# Patient Record
Sex: FEMALE | Race: BLACK OR AFRICAN AMERICAN
[De-identification: names, ages, dates, MRNs, and addresses within clinical notes are randomized per-mention and may not be internally consistent; named-entity substitution may affect disease eponyms.]

---

## 2017-04-16 ENCOUNTER — HOSPITAL ENCOUNTER (EMERGENCY)
Dept: HOSPITAL 79 - ER1 | Age: 57
Discharge: HOME | End: 2017-04-16
Payer: MEDICARE

## 2017-04-16 DIAGNOSIS — J44.9: ICD-10-CM

## 2017-04-16 DIAGNOSIS — Z79.899: ICD-10-CM

## 2017-04-16 DIAGNOSIS — F17.210: ICD-10-CM

## 2017-04-16 DIAGNOSIS — Z88.0: ICD-10-CM
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
## 2017-04-16 DIAGNOSIS — S39.012A: Primary | ICD-10-CM

## 2022-08-25 ENCOUNTER — OFFICE VISIT (OUTPATIENT)
Dept: CARDIOLOGY | Facility: CLINIC | Age: 62
End: 2022-08-25

## 2022-08-25 VITALS
SYSTOLIC BLOOD PRESSURE: 113 MMHG | DIASTOLIC BLOOD PRESSURE: 73 MMHG | HEIGHT: 63 IN | WEIGHT: 151.8 LBS | BODY MASS INDEX: 26.9 KG/M2 | HEART RATE: 62 BPM | OXYGEN SATURATION: 97 %

## 2022-08-25 DIAGNOSIS — R94.31 ABNORMAL EKG: ICD-10-CM

## 2022-08-25 DIAGNOSIS — F17.211 CIGARETTE NICOTINE DEPENDENCE IN REMISSION: ICD-10-CM

## 2022-08-25 DIAGNOSIS — Z01.810 PRE-OPERATIVE CARDIOVASCULAR EXAMINATION: ICD-10-CM

## 2022-08-25 DIAGNOSIS — R07.2 PRECORDIAL CHEST PAIN: ICD-10-CM

## 2022-08-25 DIAGNOSIS — R06.02 SHORTNESS OF BREATH: Primary | ICD-10-CM

## 2022-08-25 DIAGNOSIS — G47.33 OSA (OBSTRUCTIVE SLEEP APNEA): ICD-10-CM

## 2022-08-25 DIAGNOSIS — E78.5 DYSLIPIDEMIA: ICD-10-CM

## 2022-08-25 DIAGNOSIS — I38 VALVULAR HEART DISEASE: ICD-10-CM

## 2022-08-25 PROCEDURE — 93000 ELECTROCARDIOGRAM COMPLETE: CPT | Performed by: SPECIALIST

## 2022-08-25 PROCEDURE — 99204 OFFICE O/P NEW MOD 45 MIN: CPT | Performed by: SPECIALIST

## 2022-08-25 RX ORDER — OMEPRAZOLE 20 MG/1
20 CAPSULE, DELAYED RELEASE ORAL DAILY
COMMUNITY
Start: 2022-07-28

## 2022-08-25 RX ORDER — PRAVASTATIN SODIUM 40 MG
40 TABLET ORAL DAILY
COMMUNITY
Start: 2022-06-23 | End: 2023-02-22 | Stop reason: SDUPTHER

## 2022-08-25 RX ORDER — ACETAMINOPHEN 500 MG
500 TABLET ORAL EVERY 6 HOURS PRN
COMMUNITY
End: 2022-11-02

## 2022-08-25 RX ORDER — ACETAMINOPHEN 160 MG
TABLET,DISINTEGRATING ORAL
COMMUNITY
Start: 2022-07-28 | End: 2022-11-02

## 2022-08-25 NOTE — PROGRESS NOTES
Subjective   Initial consultation, preoperative cardiac risk assessment for knee surgery  BESSIE Garza is a 61 y.o. female who presents to day for Establish Care (INTERMITTENT CP radiates in left arm then stops,SOA,mild left leg swelling/Knee surgery clearance) and Med Management (Brought med list).    CHIEF COMPLIANT  Chief Complaint   Patient presents with   • Establish Care     INTERMITTENT CP radiates in left arm then stops,SOA,mild left leg swelling  Knee surgery clearance   • Med Management     Brought med list       Active Problems:  Problem List Items Addressed This Visit        Cardiac and Vasculature    Abnormal EKG    Relevant Orders    Ambulatory Referral to Pulmonology    Stress Test With Myocardial Perfusion One Day    Precordial chest pain    Relevant Orders    Ambulatory Referral to Pulmonology    Stress Test With Myocardial Perfusion One Day    Dyslipidemia    Valvular heart disease       Pulmonary and Pneumonias    Shortness of breath - Primary    Relevant Orders    Ambulatory Referral to Pulmonology    Stress Test With Myocardial Perfusion One Day       Sleep    RENZO (obstructive sleep apnea)       Tobacco    Cigarette nicotine dependence in remission      Other Visit Diagnoses     Pre-operative cardiovascular examination        Relevant Orders    Ambulatory Referral to Pulmonology    Stress Test With Myocardial Perfusion One Day          HPI  HPI  Patient is being seen today for preoperative assessment for knee replacement she is very sedentary because of knee pain she is have some chest pain but no recent 1 the last 1 was maybe couple months ago but she does have shortness of breath moderate exertion and intermittent edema of the left lower extremity no palpitations she quit smoking 3 weeks ago to smoke about 3 packs/day almost 50years she does have hyperlipidemia no diabetes or hypertension no significant family history known about cardiac status  PRIOR MEDS  Current Outpatient Medications on  "File Prior to Visit   Medication Sig Dispense Refill   • acetaminophen (TYLENOL) 500 MG tablet Take 500 mg by mouth Every 6 (Six) Hours As Needed for Mild Pain .     • Cholecalciferol (Vitamin D3) 50 MCG (2000 UT) capsule      • omeprazole (priLOSEC) 20 MG capsule Take 20 mg by mouth Daily.     • pravastatin (PRAVACHOL) 40 MG tablet Take 40 mg by mouth Daily.     • triamcinolone (KENALOG) 0.1 % ointment APPLY 1 APPLIED TOPICALLY 3 TIMES A DAY       No current facility-administered medications on file prior to visit.       ALLERGIES  Penicillins    HISTORY  Past Medical History:   Diagnosis Date   • Acid reflux    • Arthritis    • Depression    • Fibromyalgia    • High cholesterol    • History of back surgery    • MI, old    • Positive TB test        Social History     Socioeconomic History   • Marital status:    Tobacco Use   • Smoking status: Former Smoker   • Smokeless tobacco: Current User   • Tobacco comment: nicotine gum   Vaping Use   • Vaping Use: Former   Substance and Sexual Activity   • Alcohol use: Not Currently       History reviewed. No pertinent family history.    Review of Systems   Constitutional: Positive for activity change, appetite change and chills.   HENT: Negative.         DENTAL PROBLEMS   Eyes: Positive for discharge.   Respiratory: Positive for chest tightness and shortness of breath. Negative for apnea, cough, choking, wheezing and stridor.    Cardiovascular: Positive for leg swelling. Negative for chest pain and palpitations.   Endocrine: Positive for cold intolerance.   Genitourinary: Positive for pelvic pain.        INVOLUNTARY URINATION   Musculoskeletal: Positive for back pain.        MUSCLE PAIN,JOINT PAIN,   Skin: Positive for rash.   Hematological: Bruises/bleeds easily.       Objective     VITALS: /73   Pulse 62   Ht 160 cm (63\")   Wt 68.9 kg (151 lb 12.8 oz)   SpO2 97%   BMI 26.89 kg/m²     LABS:   Lab Results (most recent)     None          IMAGING:   No " Images in the past 120 days found..    EXAM:  Physical Exam  Vitals reviewed.   Constitutional:       Appearance: She is well-developed.   HENT:      Head: Normocephalic and atraumatic.   Eyes:      Pupils: Pupils are equal, round, and reactive to light.   Neck:      Thyroid: No thyromegaly.      Vascular: No JVD.   Cardiovascular:      Rate and Rhythm: Normal rate and regular rhythm.      Heart sounds: Normal heart sounds. No murmur heard.    No friction rub. No gallop.   Pulmonary:      Effort: Pulmonary effort is normal. No respiratory distress.      Breath sounds: Normal breath sounds. No stridor. No wheezing or rales.   Chest:      Chest wall: No tenderness.   Musculoskeletal:         General: No tenderness or deformity.      Cervical back: Neck supple.   Skin:     General: Skin is warm and dry.   Neurological:      Mental Status: She is alert and oriented to person, place, and time.      Cranial Nerves: No cranial nerve deficit.      Coordination: Coordination normal.         Procedure     ECG 12 Lead    Date/Time: 8/25/2022 10:41 AM  Performed by: Renata Barker MD  Authorized by: Renata Barker MD           EKG: Normal sinus rhythm with right bundle branch block, LVH, possible old lateral infarction, no previous EKG available for comparison       Assessment & Plan     Diagnoses and all orders for this visit:    1. Shortness of breath (Primary)  -     Ambulatory Referral to Pulmonology  -     Stress Test With Myocardial Perfusion One Day; Future    2. Pre-operative cardiovascular examination  -     Ambulatory Referral to Pulmonology  -     Stress Test With Myocardial Perfusion One Day; Future    3. Precordial chest pain  -     Ambulatory Referral to Pulmonology  -     Stress Test With Myocardial Perfusion One Day; Future    4. Abnormal EKG  -     Ambulatory Referral to Pulmonology  -     Stress Test With Myocardial Perfusion One Day; Future    5. Dyslipidemia    6. Valvular heart disease    7. Cigarette  nicotine dependence in remission    8. RENZO (obstructive sleep apnea)    Other orders  -     ECG 12 Lead    1.  Patient is going for knee replacement surgery is very sedentary she has a baseline abnormal EKG with possible old lateral infarction for this reason going to proceed with pharmacological stress testing for assessment of ischemia and for any evidence of infarction  2.  We will get an echocardiogram but she also has history of mitral and tricuspid valve regurgitation she has shortness of breath assess cardiac function wall motion and valve morphology  3.  Will get lab results from Dr. HOLLINGSWORTH`S office she is on a statin we will continue current management  4.  She quit smoking 3 weeks ago she still smokes 3 packs/day for almost 50 years she is encouraged to continue off cigarettes  5.  Regarding her sleep apnea she has not been using the CPAP refer her to pulmonology to try to get a new CPAP machine  Return After stress test.                   MEDS ORDERED DURING VISIT:  No orders of the defined types were placed in this encounter.      As always, Doris Hollingsworth MD  I appreciate very much the opportunity to participate in the cardiovascular care of your patients. Please do not hesitate to call me with any questions with regards to BESSIE Greg evaluation and management.         This document has been electronically signed by Renata Barker MD  August 25, 2022 11:52 EDT

## 2022-10-07 ENCOUNTER — APPOINTMENT (OUTPATIENT)
Dept: CARDIOLOGY | Facility: HOSPITAL | Age: 62
End: 2022-10-07

## 2022-10-18 ENCOUNTER — APPOINTMENT (OUTPATIENT)
Dept: CARDIOLOGY | Facility: HOSPITAL | Age: 62
End: 2022-10-18

## 2022-10-18 ENCOUNTER — HOSPITAL ENCOUNTER (OUTPATIENT)
Dept: CARDIOLOGY | Facility: HOSPITAL | Age: 62
Discharge: HOME | End: 2022-10-18

## 2022-10-18 DIAGNOSIS — R94.31 ABNORMAL EKG: ICD-10-CM

## 2022-10-18 DIAGNOSIS — Z01.810 PRE-OPERATIVE CARDIOVASCULAR EXAMINATION: ICD-10-CM

## 2022-10-18 DIAGNOSIS — R07.2 PRECORDIAL CHEST PAIN: ICD-10-CM

## 2022-10-18 DIAGNOSIS — R06.02 SHORTNESS OF BREATH: ICD-10-CM

## 2022-10-18 LAB
BH CV REST NUCLEAR ISOTOPE DOSE: 10 MCI
BH CV STRESS COMMENTS STAGE 1: NORMAL
BH CV STRESS DOSE REGADENOSON STAGE 1: 0.4
BH CV STRESS DURATION MIN STAGE 1: 0
BH CV STRESS DURATION SEC STAGE 1: 10
BH CV STRESS NUCLEAR ISOTOPE DOSE: 30 MCI
BH CV STRESS PROTOCOL 1: NORMAL
BH CV STRESS RECOVERY BP: NORMAL MMHG
BH CV STRESS RECOVERY HR: 77 BPM
BH CV STRESS STAGE 1: 1
LV EF NUC BP: 65 %
MAXIMAL PREDICTED HEART RATE: 158 BPM
PERCENT MAX PREDICTED HR: 51.9 %
STRESS BASELINE BP: NORMAL MMHG
STRESS BASELINE HR: 61 BPM
STRESS PERCENT HR: 61 %
STRESS POST PEAK BP: NORMAL MMHG
STRESS POST PEAK HR: 82 BPM
STRESS TARGET HR: 134 BPM

## 2022-10-18 PROCEDURE — 0 TECHNETIUM SESTAMIBI: Performed by: SPECIALIST

## 2022-10-18 PROCEDURE — A9500 TC99M SESTAMIBI: HCPCS | Performed by: SPECIALIST

## 2022-10-18 PROCEDURE — 93017 CV STRESS TEST TRACING ONLY: CPT

## 2022-10-18 PROCEDURE — 78452 HT MUSCLE IMAGE SPECT MULT: CPT | Performed by: SPECIALIST

## 2022-10-18 PROCEDURE — 93018 CV STRESS TEST I&R ONLY: CPT | Performed by: SPECIALIST

## 2022-10-18 PROCEDURE — 25010000002 REGADENOSON 0.4 MG/5ML SOLUTION: Performed by: SPECIALIST

## 2022-10-18 PROCEDURE — 78452 HT MUSCLE IMAGE SPECT MULT: CPT

## 2022-10-18 RX ADMIN — TECHNETIUM TC 99M SESTAMIBI 1 DOSE: 1 INJECTION INTRAVENOUS at 10:21

## 2022-10-18 RX ADMIN — TECHNETIUM TC 99M SESTAMIBI 1 DOSE: 1 INJECTION INTRAVENOUS at 11:29

## 2022-10-18 RX ADMIN — REGADENOSON 0.4 MG: 0.08 INJECTION, SOLUTION INTRAVENOUS at 11:29

## 2022-11-02 ENCOUNTER — OFFICE VISIT (OUTPATIENT)
Dept: CARDIOLOGY | Facility: CLINIC | Age: 62
End: 2022-11-02

## 2022-11-02 VITALS
HEIGHT: 63 IN | SYSTOLIC BLOOD PRESSURE: 110 MMHG | OXYGEN SATURATION: 97 % | DIASTOLIC BLOOD PRESSURE: 71 MMHG | WEIGHT: 152.4 LBS | HEART RATE: 65 BPM | BODY MASS INDEX: 27 KG/M2

## 2022-11-02 DIAGNOSIS — R06.02 SHORTNESS OF BREATH: ICD-10-CM

## 2022-11-02 DIAGNOSIS — Z87.891 FORMER SMOKER: ICD-10-CM

## 2022-11-02 DIAGNOSIS — R07.2 PRECORDIAL CHEST PAIN: ICD-10-CM

## 2022-11-02 DIAGNOSIS — E78.5 DYSLIPIDEMIA: ICD-10-CM

## 2022-11-02 DIAGNOSIS — G47.33 OSA (OBSTRUCTIVE SLEEP APNEA): ICD-10-CM

## 2022-11-02 DIAGNOSIS — I38 VALVULAR HEART DISEASE: Primary | ICD-10-CM

## 2022-11-02 PROBLEM — F17.211 CIGARETTE NICOTINE DEPENDENCE IN REMISSION: Status: RESOLVED | Noted: 2022-08-25 | Resolved: 2022-11-02

## 2022-11-02 PROCEDURE — 99214 OFFICE O/P EST MOD 30 MIN: CPT | Performed by: SPECIALIST

## 2022-11-02 RX ORDER — ALENDRONATE SODIUM 35 MG/1
35 TABLET ORAL
COMMUNITY
Start: 2022-10-27 | End: 2023-02-22

## 2022-11-02 RX ORDER — CELECOXIB 200 MG/1
200 CAPSULE ORAL DAILY
COMMUNITY
End: 2023-02-22

## 2022-11-02 RX ORDER — DOXYCYCLINE HYCLATE 100 MG/1
100 CAPSULE ORAL 2 TIMES DAILY
COMMUNITY
Start: 2022-10-27 | End: 2023-02-22

## 2022-11-02 RX ORDER — LOPERAMIDE HYDROCHLORIDE 2 MG/1
CAPSULE ORAL
COMMUNITY
Start: 2022-10-21 | End: 2023-02-22

## 2022-11-02 RX ORDER — VENLAFAXINE 37.5 MG/1
37.5 TABLET ORAL 2 TIMES DAILY
COMMUNITY
Start: 2022-10-27 | End: 2023-02-22 | Stop reason: DRUGHIGH

## 2022-11-02 NOTE — PROGRESS NOTES
Subjective   Follow up, stress test result  BESSIE Garza is a 61 y.o. female who presents to day for Follow-up (Here for testing f/u), Hyperlipidemia, and Sleep Apnea.    CHIEF COMPLIANT  Chief Complaint   Patient presents with   • Follow-up     Here for testing f/u   • Hyperlipidemia   • Sleep Apnea     Problem List:    1. Chest pain  1.1 Stress test, 10-, low risk study  2. Shortness of breath  3. Hyperlipidemia  4. RENZO, untreated  5. Former smoker    Problem List Items Addressed This Visit        Cardiac and Vasculature    Precordial chest pain    Relevant Orders    Adult Transthoracic Echo Complete w/ Color, Spectral and Contrast if necessary per protocol    Dyslipidemia    Valvular heart disease - Primary    Relevant Orders    Adult Transthoracic Echo Complete w/ Color, Spectral and Contrast if necessary per protocol       Pulmonary and Pneumonias    Shortness of breath    Relevant Orders    Adult Transthoracic Echo Complete w/ Color, Spectral and Contrast if necessary per protocol       Sleep    RENZO (obstructive sleep apnea)    Relevant Orders    Ambulatory Referral to Pulmonology       Tobacco    Former smoker       HPI                She says she has been doing well with no chest pain no significant shortness of breath level of activity no palpitations no dizziness she has intermittent pedal edema but otherwise she has been doing fine she still has not seen the pulmonologist regarding her sleep apnea    PRIOR MEDS  Current Outpatient Medications on File Prior to Visit   Medication Sig Dispense Refill   • alendronate (FOSAMAX) 35 MG tablet Take 1 tablet by mouth Every 7 (Seven) Days.     • celecoxib (CeleBREX) 200 MG capsule Take 1 capsule by mouth Daily.     • doxycycline (VIBRAMYCIN) 100 MG capsule Take 1 capsule by mouth 2 (Two) Times a Day.     • loperamide (IMODIUM) 2 MG capsule TAKE 1 CAPSULE EVERY 4 HOURS FOR 7 DAYS     • omeprazole (priLOSEC) 20 MG capsule Take 20 mg by mouth Daily.     •  "pravastatin (PRAVACHOL) 40 MG tablet Take 40 mg by mouth Daily.     • venlafaxine (EFFEXOR) 37.5 MG tablet Take 1 tablet by mouth 2 (Two) Times a Day.     • vitamin D3 125 MCG (5000 UT) capsule capsule Daily.     • [DISCONTINUED] acetaminophen (TYLENOL) 500 MG tablet Take 500 mg by mouth Every 6 (Six) Hours As Needed for Mild Pain .     • [DISCONTINUED] Cholecalciferol (Vitamin D3) 50 MCG (2000 UT) capsule      • [DISCONTINUED] triamcinolone (KENALOG) 0.1 % ointment APPLY 1 APPLIED TOPICALLY 3 TIMES A DAY       No current facility-administered medications on file prior to visit.       ALLERGIES  Penicillins    HISTORY  Past Medical History:   Diagnosis Date   • Acid reflux    • Arthritis    • Depression    • Fibromyalgia    • High cholesterol    • History of back surgery    • MI, old    • Osteoarthritis    • Positive TB test    • Sleep apnea        Social History     Socioeconomic History   • Marital status:    Tobacco Use   • Smoking status: Former   • Smokeless tobacco: Current   • Tobacco comments:     nicotine gum   Vaping Use   • Vaping Use: Former   Substance and Sexual Activity   • Alcohol use: Not Currently       History reviewed. No pertinent family history.    Review of Systems   Constitutional: Positive for fatigue.   HENT: Negative.    Eyes: Positive for visual disturbance (glasses).   Respiratory: Negative.    Cardiovascular: Positive for leg swelling (rt. ankle). Negative for chest pain and palpitations.   Gastrointestinal: Positive for diarrhea.   Endocrine: Negative.    Genitourinary: Negative.    Musculoskeletal: Positive for arthralgias and myalgias.   Skin: Negative.    Allergic/Immunologic: Positive for environmental allergies.   Neurological: Negative.    Hematological: Bruises/bleeds easily (both).   Psychiatric/Behavioral: Positive for sleep disturbance.       Objective     VITALS: /71 (BP Location: Left arm, Patient Position: Sitting)   Pulse 65   Ht 160 cm (62.99\")   Wt 69.1 " kg (152 lb 6.4 oz)   SpO2 97%   BMI 27.00 kg/m²     LABS:   Lab Results (most recent)     None          IMAGING:   No Images in the past 120 days found..    EXAM:  Physical Exam  Vitals reviewed.   Constitutional:       Appearance: She is well-developed.   HENT:      Head: Normocephalic and atraumatic.   Eyes:      Pupils: Pupils are equal, round, and reactive to light.   Neck:      Thyroid: No thyromegaly.      Vascular: No JVD.   Cardiovascular:      Rate and Rhythm: Normal rate and regular rhythm.      Heart sounds: Normal heart sounds. No murmur heard.    No friction rub. No gallop.   Pulmonary:      Effort: Pulmonary effort is normal. No respiratory distress.      Breath sounds: Normal breath sounds. No stridor. No wheezing or rales.   Chest:      Chest wall: No tenderness.   Musculoskeletal:         General: No tenderness or deformity.      Cervical back: Neck supple.   Skin:     General: Skin is warm and dry.   Neurological:      Mental Status: She is alert and oriented to person, place, and time.      Cranial Nerves: No cranial nerve deficit.      Coordination: Coordination normal.         Procedure   Procedures       Assessment & Plan     Diagnoses and all orders for this visit:    1. Valvular heart disease (Primary)  -     Adult Transthoracic Echo Complete w/ Color, Spectral and Contrast if necessary per protocol; Future    2. Dyslipidemia    3. Precordial chest pain  -     Adult Transthoracic Echo Complete w/ Color, Spectral and Contrast if necessary per protocol; Future    4. Shortness of breath  -     Adult Transthoracic Echo Complete w/ Color, Spectral and Contrast if necessary per protocol; Future    5. RENZO (obstructive sleep apnea)  -     Ambulatory Referral to Pulmonology    6. Former smoker    1.  We discussed the results of the stress test is no further chest pain no ischemia will get an echocardiogram to assess her cardiac function wall motion valve morphology and apparently has had history of  valvular heart disease for assessment  2.  At the regular activity she denies any shortness of breath she does have intermittent edema though  3.  She still has not seen the pulmonologist regarding the return of her CPAP we will refer her to [facility] to try to get her CPAP back  4.  I do not have any blood work available to me I will try to get the blood records from her primary care physician including her lipid profile    Return in about 3 months (around 2/2/2023).                   MEDS ORDERED DURING VISIT:  No orders of the defined types were placed in this encounter.      As always, Doris Pan MD  I appreciate very much the opportunity to participate in the cardiovascular care of your patients. Please do not hesitate to call me with any questions with regards to BESSIE Espinosadwin evaluation and management.         This document has been electronically signed by Renata Barker MD  November 2, 2022 14:37 EDT

## 2022-11-10 ENCOUNTER — OFFICE VISIT (OUTPATIENT)
Dept: PULMONOLOGY | Facility: CLINIC | Age: 62
End: 2022-11-10

## 2022-11-10 VITALS
SYSTOLIC BLOOD PRESSURE: 112 MMHG | BODY MASS INDEX: 28.52 KG/M2 | HEIGHT: 62 IN | WEIGHT: 155 LBS | DIASTOLIC BLOOD PRESSURE: 76 MMHG | HEART RATE: 61 BPM | OXYGEN SATURATION: 95 %

## 2022-11-10 DIAGNOSIS — G47.33 OSA (OBSTRUCTIVE SLEEP APNEA): Primary | ICD-10-CM

## 2022-11-10 DIAGNOSIS — F17.210 CIGARETTE SMOKER: ICD-10-CM

## 2022-11-10 PROCEDURE — 99203 OFFICE O/P NEW LOW 30 MIN: CPT | Performed by: NURSE PRACTITIONER

## 2022-11-10 RX ORDER — ALENDRONATE SODIUM 35 MG/1
TABLET ORAL
COMMUNITY
Start: 2022-10-27 | End: 2023-02-22

## 2022-11-10 RX ORDER — ACETAMINOPHEN 160 MG
TABLET,DISINTEGRATING ORAL EVERY 24 HOURS
COMMUNITY
Start: 2022-06-23 | End: 2023-02-22 | Stop reason: DRUGHIGH

## 2022-11-10 RX ORDER — DOXYCYCLINE 100 MG/10ML
INJECTION, POWDER, LYOPHILIZED, FOR SOLUTION INTRAVENOUS EVERY 12 HOURS SCHEDULED
COMMUNITY
Start: 2022-10-27 | End: 2022-11-10

## 2022-11-10 RX ORDER — PRAVASTATIN SODIUM 40 MG
TABLET ORAL EVERY 24 HOURS
COMMUNITY
End: 2023-02-22 | Stop reason: SDUPTHER

## 2022-11-10 RX ORDER — VENLAFAXINE 37.5 MG/1
TABLET ORAL EVERY 12 HOURS SCHEDULED
COMMUNITY
Start: 2022-08-02 | End: 2023-02-22 | Stop reason: DRUGHIGH

## 2022-11-10 RX ORDER — CELECOXIB 200 MG/1
CAPSULE ORAL EVERY 24 HOURS
COMMUNITY
End: 2023-02-22

## 2022-11-10 RX ORDER — OMEPRAZOLE 20 MG/1
CAPSULE, DELAYED RELEASE ORAL EVERY 24 HOURS
COMMUNITY
Start: 2022-06-14 | End: 2023-02-22 | Stop reason: SDUPTHER

## 2022-11-10 NOTE — PROGRESS NOTES
"     New Pulmonary Patient Office Visit      Patient Name: BESSIE Garza    Referring Physician: Renata Barker MD    Chief Complaint:    Chief Complaint   Patient presents with   • Consult   • Sleeping Problem       History of Present Illness: BESSIE Garza is a 61 y.o. female who is here today to establish care with Pulmonary for for obstructive sleep apnea. She was referred by Dr. Barker.     The patient reports that she was diagnosed with sleep apnea approximately 4 years ago in [place], [place]. The patient is unsure where she obtained her sleep study and CPAP machine or who prescribed her CPAP machine.  She reports she was given a CPAP machine, but she stopped using it and stopped going to the doctor. The patient reports she is constantly tired. She states she does snore. She reports in the past, she would wake up gasping for air.     She reports she recently obtained a chest x-ray and was informed her lungs were \"okay\". She denies any coughing, wheezing, and fevers.     The patient reports she is currently taking Doxycycline for irritable bowel syndrome. She adds she has had trouble with diarrhea. She reports intermittent weight loss, which she relates to bowel issues.     The patient reports she ambulates with a cane secondary to instability and joint pain    Subjective      Review of Systems:   Review of Systems   Constitutional: Positive for fatigue. Negative for fever and unexpected weight change.   Respiratory: Negative for cough, shortness of breath and wheezing.    Cardiovascular: Negative for chest pain and leg swelling.   Gastrointestinal: Positive for diarrhea.   Musculoskeletal: Positive for arthralgias.   Psychiatric/Behavioral: Positive for sleep disturbance.        Past Medical History:   Past Medical History:   Diagnosis Date   • Acid reflux    • Arthritis    • Depression    • Fibromyalgia    • High cholesterol    • History of back surgery    • MI, old    • Osteoarthritis    • Positive TB test  "   • Sleep apnea        Past Surgical History: History reviewed. No pertinent surgical history.    Family History: History reviewed. No pertinent family history.    Social History:   Social History     Socioeconomic History   • Marital status:    Tobacco Use   • Smoking status: Former   • Smokeless tobacco: Current   • Tobacco comments:     nicotine gum   Vaping Use   • Vaping Use: Former   Substance and Sexual Activity   • Alcohol use: Not Currently       Medications:     Current Outpatient Medications:   •  alendronate (FOSAMAX) 35 MG tablet, Take 1 tablet by mouth Every 7 (Seven) Days., Disp: , Rfl:   •  celecoxib (CeleBREX) 200 MG capsule, Take 1 capsule by mouth Daily., Disp: , Rfl:   •  Cholecalciferol (Vitamin D3) 50 MCG (2000 UT) capsule, Daily., Disp: , Rfl:   •  doxycycline (VIBRAMYCIN) 100 MG capsule, Take 1 capsule by mouth 2 (Two) Times a Day., Disp: , Rfl:   •  loperamide (IMODIUM) 2 MG capsule, TAKE 1 CAPSULE EVERY 4 HOURS FOR 7 DAYS, Disp: , Rfl:   •  omeprazole (priLOSEC) 20 MG capsule, Take 20 mg by mouth Daily., Disp: , Rfl:   •  pravastatin (PRAVACHOL) 40 MG tablet, Take 40 mg by mouth Daily., Disp: , Rfl:   •  riFAXIMin (XIFAXAN) 550 MG tablet, Every 8 (Eight) Hours., Disp: , Rfl:   •  venlafaxine (EFFEXOR) 37.5 MG tablet, Take 1 tablet by mouth 2 (Two) Times a Day., Disp: , Rfl:   •  vitamin D3 125 MCG (5000 UT) capsule capsule, Daily., Disp: , Rfl:   •  alendronate (FOSAMAX) 35 MG tablet, 1 tab(s), Disp: , Rfl:   •  celecoxib (CeleBREX) 200 MG capsule, Daily., Disp: , Rfl:   •  omeprazole (priLOSEC) 20 MG capsule, Daily., Disp: , Rfl:   •  pravastatin (PRAVACHOL) 40 MG tablet, Daily., Disp: , Rfl:   •  venlafaxine (EFFEXOR) 37.5 MG tablet, Every 12 (Twelve) Hours., Disp: , Rfl:     Allergies:   Allergies   Allergen Reactions   • Penicillins Unknown - High Severity     Unknown mother said, was allergic       Objective     Physical Exam:  Vital Signs:   Vitals:    11/10/22 1115   BP:  "112/76   BP Location: Left arm   Patient Position: Sitting   Cuff Size: Adult   Pulse: 61   SpO2: 95%   Weight: 70.3 kg (155 lb)   Height: 157.5 cm (62\")     Body mass index is 28.35 kg/m².    Physical Exam  Vitals reviewed.   Constitutional:       General: She is not in acute distress.     Appearance: She is not toxic-appearing.   HENT:      Head: Normocephalic and atraumatic.   Eyes:      Extraocular Movements: Extraocular movements intact.   Cardiovascular:      Rate and Rhythm: Normal rate.      Heart sounds: Normal heart sounds.   Pulmonary:      Effort: Pulmonary effort is normal.      Breath sounds: No wheezing or rhonchi.   Abdominal:      General: There is no distension.   Musculoskeletal:         General: No swelling.      Cervical back: Neck supple.   Skin:     General: Skin is warm and dry.      Findings: No rash.   Neurological:      General: No focal deficit present.      Mental Status: She is alert and oriented to person, place, and time.   Psychiatric:         Mood and Affect: Mood normal.         Behavior: Behavior normal.       Assessment / Plan      Assessment/Plan:    Diagnoses and all orders for this visit:    1. RENZO (obstructive sleep apnea) (Primary)  -     Polysomnography 4 or More Parameters With CPAP; Future    2. Cigarette smoker    The patient will have a titration sleep study ordered. We will order the patient a new machine or supplies as results of testing become available. Advised the patient to try to stop smoking. Advised the patient that she qualifies for yearly lung cancer screening, which she is receiving through her primary care physician.     Follow Up:   Return in about 3 months (around 2/10/2023) for Recheck, Follow up after testing complete.    JUANA Kenny  Pulmonary Medicine [place]    Transcribed from ambient dictation for JUANA Kenny by Dilshda Sherman.  11/10/22   12:47 EST    Patient or patient representative verbalized consent to the visit " recording.  I have personally performed the services described in this document as transcribed by the above individual, and it is both accurate and complete.

## 2022-12-16 ENCOUNTER — HOSPITAL ENCOUNTER (OUTPATIENT)
Dept: CARDIOLOGY | Facility: HOSPITAL | Age: 62
Discharge: HOME | End: 2022-12-16
Admitting: SPECIALIST

## 2022-12-16 DIAGNOSIS — R07.2 PRECORDIAL CHEST PAIN: ICD-10-CM

## 2022-12-16 DIAGNOSIS — I38 VALVULAR HEART DISEASE: ICD-10-CM

## 2022-12-16 DIAGNOSIS — R06.02 SHORTNESS OF BREATH: ICD-10-CM

## 2022-12-16 LAB
BH CV ECHO MEAS - ACS: 2.26 CM
BH CV ECHO MEAS - AI P1/2T: 806.7 MSEC
BH CV ECHO MEAS - AO MAX PG: 5.7 MMHG
BH CV ECHO MEAS - AO MEAN PG: 3.6 MMHG
BH CV ECHO MEAS - AO ROOT DIAM: 3.7 CM
BH CV ECHO MEAS - AO V2 MAX: 119.4 CM/SEC
BH CV ECHO MEAS - AO V2 VTI: 24.1 CM
BH CV ECHO MEAS - EDV(CUBED): 61.2 ML
BH CV ECHO MEAS - EDV(MOD-SP4): 70.5 ML
BH CV ECHO MEAS - EF(MOD-SP4): 70.1 %
BH CV ECHO MEAS - EF_3D-VOL: 59 %
BH CV ECHO MEAS - ESV(CUBED): 23.9 ML
BH CV ECHO MEAS - ESV(MOD-SP4): 21.1 ML
BH CV ECHO MEAS - FS: 26.9 %
BH CV ECHO MEAS - IVS/LVPW: 1.11 CM
BH CV ECHO MEAS - IVSD: 1.33 CM
BH CV ECHO MEAS - LA DIMENSION: 3.3 CM
BH CV ECHO MEAS - LAT PEAK E' VEL: 7.7 CM/SEC
BH CV ECHO MEAS - LV DIASTOLIC VOL/BSA (35-75): 41.1 CM2
BH CV ECHO MEAS - LV MASS(C)D: 175.6 GRAMS
BH CV ECHO MEAS - LV SYSTOLIC VOL/BSA (12-30): 12.3 CM2
BH CV ECHO MEAS - LVIDD: 3.9 CM
BH CV ECHO MEAS - LVIDS: 2.9 CM
BH CV ECHO MEAS - LVOT AREA: 3.4 CM2
BH CV ECHO MEAS - LVOT DIAM: 2.07 CM
BH CV ECHO MEAS - LVPWD: 1.1 CM
BH CV ECHO MEAS - MED PEAK E' VEL: 4.9 CM/SEC
BH CV ECHO MEAS - MV A MAX VEL: 66.8 CM/SEC
BH CV ECHO MEAS - MV DEC SLOPE: 151.8 CM/SEC2
BH CV ECHO MEAS - MV E MAX VEL: 49.7 CM/SEC
BH CV ECHO MEAS - MV E/A: 0.74
BH CV ECHO MEAS - RVDD: 3.2 CM
BH CV ECHO MEAS - SI(MOD-SP4): 28.8 ML/M2
BH CV ECHO MEAS - SV(MOD-SP4): 49.4 ML
BH CV ECHO MEASUREMENTS AVERAGE E/E' RATIO: 7.89
LEFT ATRIUM VOLUME INDEX: 19 ML/M2
MAXIMAL PREDICTED HEART RATE: 157 BPM
STRESS TARGET HR: 133 BPM

## 2022-12-16 PROCEDURE — 93306 TTE W/DOPPLER COMPLETE: CPT

## 2022-12-16 PROCEDURE — 93306 TTE W/DOPPLER COMPLETE: CPT | Performed by: SPECIALIST

## 2022-12-20 DIAGNOSIS — G47.33 OSA (OBSTRUCTIVE SLEEP APNEA): ICD-10-CM

## 2023-01-11 ENCOUNTER — TELEPHONE (OUTPATIENT)
Dept: CARDIOLOGY | Facility: CLINIC | Age: 63
End: 2023-01-11
Payer: MEDICARE

## 2023-01-11 NOTE — TELEPHONE ENCOUNTER
Received cardiac clearance request from  stating pt has left total hip replacement scheduled for 01/20/2023 and is requiring a cardiac clearance. Placed cardiac clearance request in Pat's inbox to review and address with provider.

## 2023-02-16 ENCOUNTER — OFFICE VISIT (OUTPATIENT)
Dept: PULMONOLOGY | Facility: CLINIC | Age: 63
End: 2023-02-16
Payer: MEDICARE

## 2023-02-16 VITALS
HEIGHT: 62 IN | HEART RATE: 88 BPM | BODY MASS INDEX: 27.79 KG/M2 | WEIGHT: 151 LBS | DIASTOLIC BLOOD PRESSURE: 62 MMHG | OXYGEN SATURATION: 96 % | SYSTOLIC BLOOD PRESSURE: 109 MMHG

## 2023-02-16 DIAGNOSIS — Z87.891 PERSONAL HISTORY OF NICOTINE DEPENDENCE: ICD-10-CM

## 2023-02-16 DIAGNOSIS — G47.33 OSA (OBSTRUCTIVE SLEEP APNEA): Primary | ICD-10-CM

## 2023-02-16 DIAGNOSIS — J44.9 CHRONIC OBSTRUCTIVE PULMONARY DISEASE, UNSPECIFIED COPD TYPE: ICD-10-CM

## 2023-02-16 PROCEDURE — 99214 OFFICE O/P EST MOD 30 MIN: CPT | Performed by: NURSE PRACTITIONER

## 2023-02-16 RX ORDER — OXYCODONE HYDROCHLORIDE 5 MG/1
TABLET ORAL
COMMUNITY
Start: 2023-01-30 | End: 2023-02-22 | Stop reason: SDUPTHER

## 2023-02-16 RX ORDER — SENNOSIDES 8.6 MG
1 TABLET ORAL DAILY
COMMUNITY
Start: 2023-01-30

## 2023-02-16 RX ORDER — ONDANSETRON 4 MG/1
TABLET, FILM COATED ORAL
COMMUNITY
Start: 2022-12-12 | End: 2023-02-22

## 2023-02-16 RX ORDER — MULTIVIT-MIN/FA/LYCOPEN/LUTEIN .4-300-25
1 TABLET ORAL DAILY
COMMUNITY
Start: 2023-01-25

## 2023-02-16 RX ORDER — CALCIUM CARBONATE 200(500)MG
3 TABLET,CHEWABLE ORAL DAILY
COMMUNITY
Start: 2023-01-25

## 2023-02-16 RX ORDER — MULTIVIT WITH MINERALS/LUTEIN
TABLET ORAL 2 TIMES DAILY
COMMUNITY
Start: 2023-01-25

## 2023-02-16 RX ORDER — ALBUTEROL SULFATE 90 UG/1
AEROSOL, METERED RESPIRATORY (INHALATION)
COMMUNITY
Start: 2023-01-07

## 2023-02-16 RX ORDER — ASPIRIN 81 MG/1
81 TABLET, COATED ORAL DAILY
COMMUNITY
Start: 2023-01-25

## 2023-02-16 RX ORDER — UMECLIDINIUM BROMIDE AND VILANTEROL TRIFENATATE 62.5; 25 UG/1; UG/1
POWDER RESPIRATORY (INHALATION)
COMMUNITY
Start: 2023-02-02

## 2023-02-16 RX ORDER — HYDROCODONE BITARTRATE AND ACETAMINOPHEN 10; 325 MG/1; MG/1
TABLET ORAL
COMMUNITY
Start: 2023-01-16 | End: 2023-02-22 | Stop reason: ALTCHOICE

## 2023-02-16 RX ORDER — OXYCODONE AND ACETAMINOPHEN 10; 325 MG/1; MG/1
TABLET ORAL
COMMUNITY
Start: 2023-02-04 | End: 2023-02-22 | Stop reason: ALTCHOICE

## 2023-02-16 RX ORDER — ALBUTEROL SULFATE 2.5 MG/3ML
2.5 SOLUTION RESPIRATORY (INHALATION) EVERY 4 HOURS PRN
Qty: 1 EACH | Refills: 1 | Status: SHIPPED | OUTPATIENT
Start: 2023-02-16 | End: 2023-03-13

## 2023-02-16 RX ORDER — CHOLECALCIFEROL TAB 125 MCG (5000 UNIT) 125 MCG (5000 UT)
TAB DAILY
COMMUNITY
Start: 2023-01-25

## 2023-02-16 NOTE — PROGRESS NOTES
"     Follow Up Office Visit      Patient Name: BESSIE Garza    Chief Complaint:    Chief Complaint   Patient presents with   • Follow-up   • Sleeping Problem       History of Present Illness: BESSIE Garza is a 62 y.o. female who is here today for follow up of RENZO. Since last visit, CPAP titration study showed optimal pressure 8 cmH2O.  The patient notes that during the test, she slept the best that she has in a long time.  She continues to snore and experience daytime fatigue without CPAP use.  She thought that she had a CPAP machine at home, but she brought this machine with her to clinic and is in fact a nebulizer machine and is not a CPAP.  The patient does not have a CPAP machine at home.  She requests albuterol solution to use in her nebulizer if needed.  She has been told that she has COPD, which is managed with use of Anoro.  She reports that her respiratory symptoms are stable.  She says that she has \"average\" dyspnea with exertion.  She recently underwent a left hip replacement and denies any respiratory complications.  She is planning to have her right knee replaced next month.  She has not smoked over the past 2 weeks though does struggle with cravings.  She reports having a recent LDCT chest ordered by her PCP and says that she received a letter saying that she needed to follow-up on the results though she does not know what the results showed.    Supplemental Oxygen: No    Subjective      Review of Systems:  Review of Systems   Constitutional: Positive for fatigue. Negative for fever and unexpected weight change.   Respiratory: Positive for shortness of breath. Negative for cough and wheezing.    Cardiovascular: Negative for chest pain and leg swelling.   Musculoskeletal: Positive for arthralgias.   Psychiatric/Behavioral: Positive for sleep disturbance.        Past Medical History:   Past Medical History:   Diagnosis Date   • Acid reflux    • Arthritis    • Depression    • Fibromyalgia    • High " cholesterol    • History of back surgery    • MI, old    • Osteoarthritis    • Positive TB test    • Sleep apnea        Past Surgical History:   Past Surgical History:   Procedure Laterality Date   • TOTAL HIP ARTHROPLASTY Left 01/20/2023    Dr. Richardson       Family History: History reviewed. No pertinent family history.    Social History:   Social History     Socioeconomic History   • Marital status:    Tobacco Use   • Smoking status: Former   • Smokeless tobacco: Current   • Tobacco comments:        Vaping Use   • Vaping Use: Former   Substance and Sexual Activity   • Alcohol use: Not Currently       Current Medications:     Current Outpatient Medications:   •  Anoro Ellipta 62.5-25 MCG/ACT aerosol powder  inhaler, 1 PUFF(S) INHALED ONCE A DAY 30 DAY(S), Disp: , Rfl:   •  ascorbic acid (VITAMIN C) 1000 MG tablet, , Disp: , Rfl:   •  Aspirin Low Dose 81 MG EC tablet, , Disp: , Rfl:   •  calcium carbonate (TUMS) 500 MG chewable tablet, , Disp: , Rfl:   •  CertaVite Senior tablet tablet, , Disp: , Rfl:   •  Cholecalciferol (Vitamin D3) 50 MCG (2000 UT) capsule, Daily., Disp: , Rfl:   •  HYDROcodone-acetaminophen (NORCO)  MG per tablet, TAKE 1 TABLET EVERY 12 HOURS FOR 10 DAYS., Disp: , Rfl:   •  loperamide (IMODIUM) 2 MG capsule, TAKE 1 CAPSULE EVERY 4 HOURS FOR 7 DAYS, Disp: , Rfl:   •  Natural Vitamin D-3 125 MCG (5000 UT) tablet, , Disp: , Rfl:   •  omeprazole (priLOSEC) 20 MG capsule, Take 20 mg by mouth Daily., Disp: , Rfl:   •  ondansetron (ZOFRAN) 4 MG tablet, TAKE 1 TAB(S) ORALLY EVERY 8 HOURS 5 DAYS, Disp: , Rfl:   •  oxyCODONE (ROXICODONE) 5 MG immediate release tablet, TAKE 1 TABLET EVERY 6 HOYRS AS NEEDED FOR BREAK THROUGH LEFT POST OP HIP PAIN, Disp: , Rfl:   •  oxyCODONE-acetaminophen (PERCOCET)  MG per tablet, TAKE 1 TABLET EVERY 6 HOURS AS NEEDED FOR POST OP PAIN, Disp: , Rfl:   •  pravastatin (PRAVACHOL) 40 MG tablet, Take 40 mg by mouth Daily., Disp: , Rfl:   •  senna 8.6 MG  "tablet, , Disp: , Rfl:   •  venlafaxine (EFFEXOR) 37.5 MG tablet, Take 1 tablet by mouth 2 (Two) Times a Day., Disp: , Rfl:   •  Ventolin  (90 Base) MCG/ACT inhaler, 2 PUFF(S) INHALED EVERY 6 HOURS AS NEEDED, Disp: , Rfl:   •  vitamin D3 125 MCG (5000 UT) capsule capsule, Daily., Disp: , Rfl:   •  alendronate (FOSAMAX) 35 MG tablet, Take 1 tablet by mouth Every 7 (Seven) Days., Disp: , Rfl:   •  celecoxib (CeleBREX) 200 MG capsule, Take 1 capsule by mouth Daily., Disp: , Rfl:   •  doxycycline (VIBRAMYCIN) 100 MG capsule, Take 1 capsule by mouth 2 (Two) Times a Day., Disp: , Rfl:   •  riFAXIMin (XIFAXAN) 550 MG tablet, Every 8 (Eight) Hours., Disp: , Rfl:       Allergies:   Allergies   Allergen Reactions   • Penicillins Unknown - High Severity     Unknown mother said, was allergic       Objective     Physical Exam:  Vital Signs:   Vitals:    02/16/23 1057   BP: 109/62   BP Location: Left arm   Patient Position: Sitting   Cuff Size: Adult   Pulse: 88   SpO2: 96%   Weight: 68.5 kg (151 lb)   Height: 157.5 cm (62\")     Body mass index is 27.62 kg/m².    Physical Exam  Vitals reviewed.   Constitutional:       General: She is not in acute distress.     Appearance: She is not toxic-appearing.   HENT:      Head: Normocephalic and atraumatic.   Eyes:      Extraocular Movements: Extraocular movements intact.      Conjunctiva/sclera: Conjunctivae normal.      Pupils: Pupils are equal, round, and reactive to light.   Cardiovascular:      Rate and Rhythm: Normal rate.      Heart sounds: Normal heart sounds.   Pulmonary:      Effort: Pulmonary effort is normal.      Breath sounds: Normal breath sounds.   Abdominal:      General: There is no distension.   Musculoskeletal:         General: No swelling.      Cervical back: Neck supple.      Comments: Ambulating with a cane   Skin:     General: Skin is warm and dry.      Findings: No rash.   Neurological:      General: No focal deficit present.      Mental Status: She is alert " and oriented to person, place, and time.   Psychiatric:         Mood and Affect: Mood normal.         Behavior: Behavior normal.       Assessment / Plan      Assessment/Plan:   Diagnoses and all orders for this visit:    1. RENZO (obstructive sleep apnea) (Primary)  -     PAP Therapy  Recent CPAP titration study reviewed and discussed with patient.  Results showed optimal CPAP pressure of 8 cmH2O.  Order written for patient to resume use of CPAP.  Nightly compliance advised.    2. Personal history of nicotine dependence  -     nicotine polacrilex (RA Nicotine Gum) 4 MG gum; Chew 1 each As Needed for Smoking Cessation.  Dispense: 1 each; Refill: 3  Ongoing cessation advised.  Can use nicotine gum to help with cravings.  We will attempt to obtain the recent LDCT chest as ordered by her PCP that she reports having done at either [facility] or the [facility], patient is unsure of which facility the scan was performed at.    3. Chronic obstructive pulmonary disease, unspecified COPD type (HCC)  -     albuterol (PROVENTIL) (2.5 MG/3ML) 0.083% nebulizer solution; Take 2.5 mg by nebulization Every 4 (Four) Hours As Needed for Wheezing.  Dispense: 1 each; Refill: 1  Continues on Anoro with good symptom control.    Follow Up:   Return in about 3 months (around 5/16/2023) for Recheck.  The patient was counseled on diagnostic results, risks and benefits of treatment options, risk factor modifications and the importance of treatment compliance. The patient was advised to contact the clinic with concerns or worsening symptoms.     JUANA Kenny   [facility]

## 2023-02-22 ENCOUNTER — OFFICE VISIT (OUTPATIENT)
Dept: CARDIOLOGY | Facility: CLINIC | Age: 63
End: 2023-02-22
Payer: MEDICARE

## 2023-02-22 VITALS
HEART RATE: 69 BPM | BODY MASS INDEX: 28.05 KG/M2 | SYSTOLIC BLOOD PRESSURE: 120 MMHG | WEIGHT: 152.4 LBS | HEIGHT: 62 IN | DIASTOLIC BLOOD PRESSURE: 69 MMHG | OXYGEN SATURATION: 96 %

## 2023-02-22 DIAGNOSIS — I35.1 NONRHEUMATIC AORTIC VALVE REGURGITATION: ICD-10-CM

## 2023-02-22 DIAGNOSIS — E78.5 DYSLIPIDEMIA: ICD-10-CM

## 2023-02-22 DIAGNOSIS — G47.33 OSA (OBSTRUCTIVE SLEEP APNEA): ICD-10-CM

## 2023-02-22 DIAGNOSIS — R06.02 SHORTNESS OF BREATH: ICD-10-CM

## 2023-02-22 DIAGNOSIS — R07.2 PRECORDIAL CHEST PAIN: ICD-10-CM

## 2023-02-22 DIAGNOSIS — Z01.810 PRE-OPERATIVE CARDIOVASCULAR EXAMINATION: Primary | ICD-10-CM

## 2023-02-22 DIAGNOSIS — Z87.891 FORMER SMOKER: ICD-10-CM

## 2023-02-22 PROCEDURE — 99214 OFFICE O/P EST MOD 30 MIN: CPT | Performed by: SPECIALIST

## 2023-02-22 RX ORDER — ACETAMINOPHEN 500 MG
500 TABLET ORAL EVERY 6 HOURS PRN
COMMUNITY

## 2023-02-22 RX ORDER — PRAVASTATIN SODIUM 40 MG
40 TABLET ORAL DAILY
Qty: 90 TABLET | Refills: 1 | Status: SHIPPED | OUTPATIENT
Start: 2023-02-22

## 2023-02-22 RX ORDER — VENLAFAXINE 75 MG/1
1 TABLET ORAL EVERY 12 HOURS SCHEDULED
COMMUNITY
Start: 2023-02-20

## 2023-02-22 NOTE — PROGRESS NOTES
Subjective   Follow up, hypertension  BESSIE Garza is a 62 y.o. female who presents to day for Follow-up (Here for 3 mo. F/u), Hyperlipidemia, Cardiac Valve Problem (VHD), Sleep Apnea, and Shortness of Breath.    CHIEF COMPLIANT  Chief Complaint   Patient presents with   • Follow-up     Here for 3 mo. F/u   • Hyperlipidemia   • Cardiac Valve Problem     VHD   • Sleep Apnea   • Shortness of Breath   Problem List:     1. Chest pain  1.1 Stress test, 10-, low risk study  2. Shortness of breath  3. Hyperlipidemia  4. RENZO, untreated  5. Former smoker    Problem List Items Addressed This Visit        Cardiac and Vasculature    Precordial chest pain    Dyslipidemia    Nonrheumatic aortic valve regurgitation    Pre-operative cardiovascular examination - Primary       Pulmonary and Pneumonias    Shortness of breath       Sleep    RENZO (obstructive sleep apnea)       Tobacco    Former smoker       HPI    She just had left hip replacement last month she is doing really well and she is going to have a right knee replacement also within the next few weeks from the cardiac standpoint she is denying any symptoms with no chest pain no shortness of breath no palpitations no edema and she quitted smoking too                  PRIOR MEDS  Current Outpatient Medications on File Prior to Visit   Medication Sig Dispense Refill   • acetaminophen (TYLENOL) 500 MG tablet Take 500 mg by mouth Every 6 (Six) Hours As Needed for Mild Pain.     • albuterol (PROVENTIL) (2.5 MG/3ML) 0.083% nebulizer solution Take 2.5 mg by nebulization Every 4 (Four) Hours As Needed for Wheezing. 1 each 1   • Anoro Ellipta 62.5-25 MCG/ACT aerosol powder  inhaler 1 PUFF(S) INHALED ONCE A DAY 30 DAY(S)     • ascorbic acid (VITAMIN C) 1000 MG tablet 2 (Two) Times a Day.     • Aspirin Low Dose 81 MG EC tablet 81 mg Daily.     • calcium carbonate (TUMS) 500 MG chewable tablet 3 tablets Daily.     • CertaVite Senior tablet tablet 1 tablet Daily.     • Diclofenac  Sodium (VOLTAREN) 1 % gel gel Apply 4 g topically to the appropriate area as directed 4 (Four) Times a Day As Needed.     • Natural Vitamin D-3 125 MCG (5000 UT) tablet Daily.     • nicotine polacrilex (RA Nicotine Gum) 4 MG gum Chew 1 each As Needed for Smoking Cessation. 1 each 3   • omeprazole (priLOSEC) 20 MG capsule Take 20 mg by mouth Daily.     • pravastatin (PRAVACHOL) 40 MG tablet Take 40 mg by mouth Daily.     • senna 8.6 MG tablet Take 1 tablet by mouth Daily.     • venlafaxine (EFFEXOR) 75 MG tablet Take 1 tablet by mouth Every 12 (Twelve) Hours.     • Ventolin  (90 Base) MCG/ACT inhaler 2 PUFF(S) INHALED EVERY 6 HOURS AS NEEDED     • vitamin D3 125 MCG (5000 UT) capsule capsule Daily.     • oxyCODONE-acetaminophen (PERCOCET)  MG per tablet TAKE 1 TABLET EVERY 6 HOURS AS NEEDED FOR POST OP PAIN     • [DISCONTINUED] alendronate (FOSAMAX) 35 MG tablet Take 1 tablet by mouth Every 7 (Seven) Days.     • [DISCONTINUED] alendronate (FOSAMAX) 35 MG tablet 1 tab(s)     • [DISCONTINUED] celecoxib (CeleBREX) 200 MG capsule Take 1 capsule by mouth Daily.     • [DISCONTINUED] celecoxib (CeleBREX) 200 MG capsule Daily.     • [DISCONTINUED] Cholecalciferol (Vitamin D3) 50 MCG (2000 UT) capsule Daily.     • [DISCONTINUED] doxycycline (VIBRAMYCIN) 100 MG capsule Take 1 capsule by mouth 2 (Two) Times a Day.     • [DISCONTINUED] HYDROcodone-acetaminophen (NORCO)  MG per tablet TAKE 1 TABLET EVERY 12 HOURS FOR 10 DAYS.     • [DISCONTINUED] loperamide (IMODIUM) 2 MG capsule TAKE 1 CAPSULE EVERY 4 HOURS FOR 7 DAYS     • [DISCONTINUED] omeprazole (priLOSEC) 20 MG capsule Daily.     • [DISCONTINUED] ondansetron (ZOFRAN) 4 MG tablet TAKE 1 TAB(S) ORALLY EVERY 8 HOURS 5 DAYS     • [DISCONTINUED] oxyCODONE (ROXICODONE) 5 MG immediate release tablet TAKE 1 TABLET EVERY 6 HOYRS AS NEEDED FOR BREAK THROUGH LEFT POST OP HIP PAIN     • [DISCONTINUED] pravastatin (PRAVACHOL) 40 MG tablet Daily.     • [DISCONTINUED]  "riFAXIMin (XIFAXAN) 550 MG tablet Every 8 (Eight) Hours.     • [DISCONTINUED] venlafaxine (EFFEXOR) 37.5 MG tablet Take 1 tablet by mouth 2 (Two) Times a Day.     • [DISCONTINUED] venlafaxine (EFFEXOR) 37.5 MG tablet Every 12 (Twelve) Hours.       No current facility-administered medications on file prior to visit.       ALLERGIES  Penicillins    HISTORY  Past Medical History:   Diagnosis Date   • Acid reflux    • Arthritis    • Depression    • Fibromyalgia    • High cholesterol    • History of back surgery    • MI, old    • Osteoarthritis    • Positive TB test    • Sleep apnea        Social History     Socioeconomic History   • Marital status:    Tobacco Use   • Smoking status: Former   • Smokeless tobacco: Current   • Tobacco comments:     nicotine gum   Vaping Use   • Vaping Use: Former   Substance and Sexual Activity   • Alcohol use: Not Currently       History reviewed. No pertinent family history.    Review of Systems   Constitutional: Positive for fatigue.   HENT: Negative.    Eyes: Positive for visual disturbance (glasses).   Respiratory: Negative.    Cardiovascular: Negative.    Gastrointestinal: Positive for constipation.   Endocrine: Negative.    Genitourinary: Negative.    Musculoskeletal: Positive for arthralgias, gait problem (ambulates with cane) and myalgias.   Skin: Negative.    Allergic/Immunologic: Negative.    Neurological: Negative for dizziness, syncope and light-headedness.   Hematological: Bruises/bleeds easily.   Psychiatric/Behavioral: Positive for sleep disturbance.       Objective     VITALS: /69 (BP Location: Left arm, Patient Position: Sitting)   Pulse 69   Ht 157.5 cm (62.01\")   Wt 69.1 kg (152 lb 6.4 oz)   SpO2 96%   BMI 27.87 kg/m²     LABS:   Lab Results (most recent)     None          IMAGING:   No Images in the past 120 days found..    EXAM:  Physical Exam  Vitals reviewed.   Constitutional:       Appearance: She is well-developed.   HENT:      Head: " Normocephalic and atraumatic.   Eyes:      Pupils: Pupils are equal, round, and reactive to light.   Neck:      Thyroid: No thyromegaly.      Vascular: No JVD.   Cardiovascular:      Rate and Rhythm: Normal rate and regular rhythm.      Heart sounds: Normal heart sounds. No murmur heard.    No friction rub. No gallop.   Pulmonary:      Effort: Pulmonary effort is normal. No respiratory distress.      Breath sounds: Normal breath sounds. No stridor. No wheezing or rales.   Chest:      Chest wall: No tenderness.   Musculoskeletal:         General: No tenderness or deformity.      Cervical back: Neck supple.   Skin:     General: Skin is warm and dry.   Neurological:      Mental Status: She is alert and oriented to person, place, and time.      Cranial Nerves: No cranial nerve deficit.      Coordination: Coordination normal.         Procedure   Procedures       Assessment & Plan     Diagnoses and all orders for this visit:    1. Pre-operative cardiovascular examination (Primary)    2. Dyslipidemia    3. Precordial chest pain    4. Shortness of breath    5. RENZO (obstructive sleep apnea)    6. Former smoker    7. Nonrheumatic aortic valve regurgitation    1.  She already had the left hip replaced she is going to have a right knee replacement and she is mild preoperative cardiac risk for the above surgery she can hold aspirin 5 to 7 days prior to surgery if needs  2.  Her blood pressure is well controlled we will continue current management  3.  She is denying any chest pain at all continue current management  4.  We discussed the results of the echocardiogram which showed diastolic dysfunction and mild aortic regurg advised her to eat low-salt diet  5.  I am very happy that she quit the smoking this last month excellent work  6.  I do not have any recent labs her labs back in July showed significantly elevated LDL I will try to get labs prior to her next visit we will continue with the statin for now  7.  She is going to  get the CPAP soon Pina feels ARNP is months following her    No follow-ups on file.                   MEDS ORDERED DURING VISIT:  No orders of the defined types were placed in this encounter.      As always, Doris Pan MD  I appreciate very much the opportunity to participate in the cardiovascular care of your patients. Please do not hesitate to call me with any questions with regards to BESSIE Garza evaluation and management.         This document has been electronically signed by Renata Barker MD  February 22, 2023 13:27 EST    This note is dictated utilizing voice recognition software.

## 2023-03-11 DIAGNOSIS — J44.9 CHRONIC OBSTRUCTIVE PULMONARY DISEASE, UNSPECIFIED COPD TYPE: ICD-10-CM

## 2023-03-13 RX ORDER — ALBUTEROL SULFATE 2.5 MG/3ML
SOLUTION RESPIRATORY (INHALATION)
Qty: 75 ML | Refills: 1 | Status: SHIPPED | OUTPATIENT
Start: 2023-03-13

## 2023-09-13 ENCOUNTER — LAB (OUTPATIENT)
Dept: LAB | Facility: HOSPITAL | Age: 63
End: 2023-09-13
Payer: MEDICARE

## 2023-09-13 DIAGNOSIS — E78.5 DYSLIPIDEMIA: ICD-10-CM

## 2023-09-13 LAB
ALBUMIN SERPL-MCNC: 4 G/DL (ref 3.5–5.2)
ALBUMIN/GLOB SERPL: 1.5 G/DL
ALP SERPL-CCNC: 94 U/L (ref 39–117)
ALT SERPL W P-5'-P-CCNC: 8 U/L (ref 1–33)
ANION GAP SERPL CALCULATED.3IONS-SCNC: 9.9 MMOL/L (ref 5–15)
AST SERPL-CCNC: 14 U/L (ref 1–32)
BILIRUB SERPL-MCNC: 0.4 MG/DL (ref 0–1.2)
BUN SERPL-MCNC: 9 MG/DL (ref 8–23)
BUN/CREAT SERPL: 12.7 (ref 7–25)
CALCIUM SPEC-SCNC: 9.7 MG/DL (ref 8.6–10.5)
CHLORIDE SERPL-SCNC: 107 MMOL/L (ref 98–107)
CHOLEST SERPL-MCNC: 191 MG/DL (ref 0–200)
CO2 SERPL-SCNC: 25.1 MMOL/L (ref 22–29)
CREAT SERPL-MCNC: 0.71 MG/DL (ref 0.57–1)
EGFRCR SERPLBLD CKD-EPI 2021: 95.7 ML/MIN/1.73
GLOBULIN UR ELPH-MCNC: 2.7 GM/DL
GLUCOSE SERPL-MCNC: 99 MG/DL (ref 65–99)
HDLC SERPL-MCNC: 44 MG/DL (ref 40–60)
LDLC SERPL CALC-MCNC: 122 MG/DL (ref 0–100)
LDLC/HDLC SERPL: 2.7 {RATIO}
POTASSIUM SERPL-SCNC: 4.2 MMOL/L (ref 3.5–5.2)
PROT SERPL-MCNC: 6.7 G/DL (ref 6–8.5)
SODIUM SERPL-SCNC: 142 MMOL/L (ref 136–145)
TRIGL SERPL-MCNC: 140 MG/DL (ref 0–150)
VLDLC SERPL-MCNC: 25 MG/DL (ref 5–40)

## 2023-09-13 PROCEDURE — 80053 COMPREHEN METABOLIC PANEL: CPT | Performed by: SPECIALIST

## 2023-09-13 PROCEDURE — 80061 LIPID PANEL: CPT | Performed by: SPECIALIST
